# Patient Record
Sex: MALE | Race: BLACK OR AFRICAN AMERICAN | NOT HISPANIC OR LATINO
[De-identification: names, ages, dates, MRNs, and addresses within clinical notes are randomized per-mention and may not be internally consistent; named-entity substitution may affect disease eponyms.]

---

## 2021-11-23 ENCOUNTER — DASHBOARD ENCOUNTERS (OUTPATIENT)
Age: 51
End: 2021-11-23

## 2021-11-23 ENCOUNTER — LAB OUTSIDE AN ENCOUNTER (OUTPATIENT)
Dept: URBAN - METROPOLITAN AREA CLINIC 70 | Facility: CLINIC | Age: 51
End: 2021-11-23

## 2021-11-23 ENCOUNTER — WEB ENCOUNTER (OUTPATIENT)
Dept: URBAN - METROPOLITAN AREA CLINIC 70 | Facility: CLINIC | Age: 51
End: 2021-11-23

## 2021-11-23 ENCOUNTER — OFFICE VISIT (OUTPATIENT)
Dept: URBAN - METROPOLITAN AREA CLINIC 70 | Facility: CLINIC | Age: 51
End: 2021-11-23
Payer: COMMERCIAL

## 2021-11-23 VITALS
SYSTOLIC BLOOD PRESSURE: 166 MMHG | TEMPERATURE: 97.5 F | BODY MASS INDEX: 32.42 KG/M2 | DIASTOLIC BLOOD PRESSURE: 121 MMHG | HEIGHT: 74 IN | WEIGHT: 252.6 LBS | HEART RATE: 67 BPM

## 2021-11-23 DIAGNOSIS — K76.9 LIVER LESION: ICD-10-CM

## 2021-11-23 DIAGNOSIS — K76.0 FATTY LIVER: ICD-10-CM

## 2021-11-23 DIAGNOSIS — Z12.11 COLON CANCER SCREENING: ICD-10-CM

## 2021-11-23 DIAGNOSIS — R79.89 ELEVATED LFTS: ICD-10-CM

## 2021-11-23 PROBLEM — 197321007 FATTY LIVER: Status: ACTIVE | Noted: 2021-11-23

## 2021-11-23 PROBLEM — 300331000: Status: ACTIVE | Noted: 2021-11-23

## 2021-11-23 PROCEDURE — 99201 OFFICE OR OTHER OUTPATIENT VISIT FOR THE EVALUATION AND MANAGEMENT OF A NEW PATIENT, WHICH REQUIRES THESE 3 KEY COMPONENTS: A PROBLEM FOCUSED HISTOR: CPT | Performed by: INTERNAL MEDICINE

## 2021-11-23 PROCEDURE — 99202 OFFICE O/P NEW SF 15 MIN: CPT | Performed by: INTERNAL MEDICINE

## 2021-11-23 RX ORDER — LISINOPRIL 20 MG/1
1 TABLET TABLET ORAL ONCE A DAY
Status: ACTIVE | COMMUNITY

## 2021-11-23 NOTE — HPI-TODAY'S VISIT:
Pt presents for evaluation of elevated LFTs. Labs on 9/13/21 show Total Bilirubin 0.4, alkaline phosphatase 58, , and AST 82.  He reports mild alcohol use but has not drank at all since April when he was told that he had fatty liver. US on 4/8/21 showed 3 solid liver masses and multiple liver cysts. CT scan on 4/16/21 showed diffuse fatty liver and vague nodular areas of increased attenuation in the right lobe of the liver which could represent areas of focal fatty sparing or isodense nonenhancing nodules. MRI w/o contrast showed fatty liver and 3 liver lesions that appeared stable compared to the prior CT scan.   Pt also needs colon cancer screening. No prior colonoscopy. No family history of colon cancer. He denies any abdominal pain, diarrhea, constipation, rectal bleeding or weight loss.

## 2021-11-28 LAB
ACTIN (SMOOTH MUSCLE) ANTIBODY: 12
AFP, SERUM, TUMOR MARKER: 3.5
ALBUMIN: 4.7
ALKALINE PHOSPHATASE: 62
ALPHA-1-ANTITRYPSIN, SERUM: 148
ALT (SGPT): 187
ANTINUCLEAR ANTIBODIES, IFA: NEGATIVE
AST (SGOT): 96
BILIRUBIN, DIRECT: 0.12
BILIRUBIN, TOTAL: 0.5
CERULOPLASMIN: 24.1
FERRITIN, SERUM: 820
HBSAG SCREEN: NEGATIVE
HEP A AB, IGM: NEGATIVE
HEP B CORE AB, IGM: NEGATIVE
HEP C VIRUS AB: 0.1
IRON BIND.CAP.(TIBC): 313
IRON SATURATION: 28
IRON: 87
Lab: (no result)
MITOCHONDRIAL (M2) ANTIBODY: <20
PROTEIN, TOTAL: 7.4
UIBC: 226

## 2021-12-13 PROBLEM — 305058001: Status: ACTIVE | Noted: 2021-12-13

## 2022-01-14 ENCOUNTER — OFFICE VISIT (OUTPATIENT)
Dept: URBAN - METROPOLITAN AREA SURGERY CENTER 24 | Facility: SURGERY CENTER | Age: 52
End: 2022-01-14

## 2023-03-17 ENCOUNTER — CLAIMS CREATED FROM THE CLAIM WINDOW (OUTPATIENT)
Dept: URBAN - METROPOLITAN AREA SURGERY CENTER 24 | Facility: SURGERY CENTER | Age: 53
End: 2023-03-17

## 2023-03-17 ENCOUNTER — CLAIMS CREATED FROM THE CLAIM WINDOW (OUTPATIENT)
Dept: URBAN - METROPOLITAN AREA SURGERY CENTER 24 | Facility: SURGERY CENTER | Age: 53
End: 2023-03-17
Payer: COMMERCIAL

## 2023-03-17 DIAGNOSIS — Z12.11 COLON CANCER SCREENING: ICD-10-CM

## 2023-03-17 DIAGNOSIS — K63.89 APPENDICITIS EPIPLOICA: ICD-10-CM

## 2023-03-17 PROCEDURE — G8907 PT DOC NO EVENTS ON DISCHARG: HCPCS | Performed by: INTERNAL MEDICINE

## 2023-03-17 PROCEDURE — 45380 COLONOSCOPY AND BIOPSY: CPT | Performed by: INTERNAL MEDICINE

## 2023-03-17 RX ORDER — LISINOPRIL 20 MG/1
1 TABLET TABLET ORAL ONCE A DAY
Status: ACTIVE | COMMUNITY